# Patient Record
Sex: FEMALE | Race: AMERICAN INDIAN OR ALASKA NATIVE
[De-identification: names, ages, dates, MRNs, and addresses within clinical notes are randomized per-mention and may not be internally consistent; named-entity substitution may affect disease eponyms.]

---

## 2022-01-18 LAB
ALBUMIN SERPL-MCNC: 4.1 G/DL (ref 3.9–5)
ALT SERPL-CCNC: 28 UNITS/L (ref 7–56)
BUN SERPL-MCNC: 10 MG/DL (ref 7–17)
BUN/CREAT SERPL: 17 %
CALCIUM SERPL-MCNC: 9.6 MG/DL (ref 8.4–10.2)
HCT VFR BLD CALC: 41.2 % (ref 30.3–42.9)
HEMOLYSIS INDEX: 2
HGB BLD-MCNC: 13.2 GM/DL (ref 10.1–14.3)
MCHC RBC AUTO-ENTMCNC: 32 % (ref 30–34)
MCV RBC AUTO: 88 FL (ref 79–97)
PLATELET # BLD: 272 K/MM3 (ref 140–440)
RBC # BLD AUTO: 4.69 M/MM3 (ref 3.65–5.03)

## 2022-01-18 NOTE — ANESTHESIA CONSULTATION
Anesthesia Consult and Med Hx


Date of service: 01/19/22





- Airway


Anesthetic Teeth Evaluation: Good


ROM Head & Neck: Adequate


Mental/Hyoid Distance: Adequate


Mallampati Class: Class II


Intubation Access Assessment: Good





- Pre-Operative Health Status


ASA Pre-Surgery Classification: ASA1


Proposed Anesthetic Plan: General





- Pulmonary


Hx Smoking: No


Hx Sleep Apnea: No (MARIBELL PRE SCREEN LOW RISK)





- Cardiovascular System


Hx Hypertension: No





- Central Nervous System


Hx Psychiatric Problems: No





- Gastrointestinal


Hx Gastroesophageal Reflux Disease: No





- Hematic


Hx Anemia: No


Hx Sickle Cell Disease: No





- Other Systems


Hx Cancer: No


Hx Obesity: Yes

## 2022-01-19 ENCOUNTER — HOSPITAL ENCOUNTER (OUTPATIENT)
Dept: HOSPITAL 5 - OR | Age: 37
Discharge: HOME | End: 2022-01-19
Attending: UROLOGY
Payer: COMMERCIAL

## 2022-01-19 VITALS — DIASTOLIC BLOOD PRESSURE: 78 MMHG | SYSTOLIC BLOOD PRESSURE: 129 MMHG

## 2022-01-19 DIAGNOSIS — Z79.899: ICD-10-CM

## 2022-01-19 DIAGNOSIS — Z20.822: ICD-10-CM

## 2022-01-19 DIAGNOSIS — E66.9: ICD-10-CM

## 2022-01-19 DIAGNOSIS — Z98.890: ICD-10-CM

## 2022-01-19 DIAGNOSIS — N39.3: Primary | ICD-10-CM

## 2022-01-19 PROCEDURE — 36415 COLL VENOUS BLD VENIPUNCTURE: CPT

## 2022-01-19 PROCEDURE — 57288 REPAIR BLADDER DEFECT: CPT

## 2022-01-19 PROCEDURE — C1771 REP DEV, URINARY, W/SLING: HCPCS

## 2022-01-19 PROCEDURE — 80053 COMPREHEN METABOLIC PANEL: CPT

## 2022-01-19 PROCEDURE — 85027 COMPLETE CBC AUTOMATED: CPT

## 2022-01-19 PROCEDURE — 84703 CHORIONIC GONADOTROPIN ASSAY: CPT

## 2022-01-19 PROCEDURE — U0003 INFECTIOUS AGENT DETECTION BY NUCLEIC ACID (DNA OR RNA); SEVERE ACUTE RESPIRATORY SYNDROME CORONAVIRUS 2 (SARS-COV-2) (CORONAVIRUS DISEASE [COVID-19]), AMPLIFIED PROBE TECHNIQUE, MAKING USE OF HIGH THROUGHPUT TECHNOLOGIES AS DESCRIBED BY CMS-2020-01-R: HCPCS

## 2022-01-19 NOTE — OPERATIVE REPORT
DATE OF SURGERY: 01/19/2022



PREOPERATIVE DIAGNOSIS:  Stress urinary incontinence.



POSTOPERATIVE DIAGNOSIS:  Stress urinary incontinence.



PROCEDURES:  Cystoscopy, pubovaginal sling (TVT).



SURGEON:  Elier Reagan MD



ANESTHESIA:  General.



ESTIMATED BLOOD LOSS:  Minimal.



FLUIDS:  Crystalloid.



COMPLICATIONS:  No complications.



INDICATIONS:  This patient is a 36-year-old female seen in the office for 

persistent stress urinary incontinence for 2 years.  The patient has 2 kids 

delivered vaginally.  She wears 4 pads a day, Kegel's, trial of several 

medications including Ditropan, Myrbetriq, and Gemtesa, continued to have 

incontinence.  Urodynamic testing, peak flow 22 mL a second with stress 

incontinence and muscle weakness.  Discussed options, risks, benefits, 

complications were explained.  The patient agreed to proceed with surgical 

intervention.



DESCRIPTION OF PROCEDURE:  The patient was taken to the operative suite, placed 

in a supine position.  After adequate general anesthesia, she was then placed in

the dorsal lithotomy position, prepped and draped in a sterile fashion.  She was

placed in the dorsal lithotomy position, did not have a significant cystocele.  

Dyson catheter was placed.  Daniela-colored urine returned, midline anterior 

vaginal wall incision was made, lateral flaps were created.  The finger 

dissection posteriorly the pubic rami up to the rectus fascia could be 

appreciated.  Then, using the TVT tape and a trocar, the tape was brought from 

the vaginal area onto the suprapubic area, a small incision was made bilaterally

to allow the trocar to advance into the suprapubic area.  Cystoscopy was 

performed.  No bladder injury could be appreciated. With gentle tension, the TVT

tape was deployed.  Redundant tape was cut suprapubically.  The 2-0 Vicryl 

suture was used on the suprapubic area.  Redundant vaginal wall was excised and 

vaginal wall was closed with a 2-0 Vicryl in a running fashion.  Adequate 

hemostasis was achieved.  Vaginal packing was placed.  She was extubated and 

taken to recovery room.  She will go home with Dyson catheter and follow up in 

the office.







DD: 01/19/2022 10:23 AM

DT: 01/19/2022 11:22 AM

TID: [id number] RECEIPT: [id number]

[facility]/PRE/IQB

## 2022-01-19 NOTE — SHORT STAY SUMMARY
Short Stay Documentation


Date of service: 01/19/22





- History


H&P: obtained from office





- Allergies and Medications


Current Medications: 


                                    Allergies





No Known Allergies Allergy (Verified 01/13/22 09:22)


   





                                Home Medications











 Medication  Instructions  Recorded  Confirmed  Last Taken  Type


 


Birth Control Pills 1 tab PO DAILY 01/13/22 01/19/22 01/19/22 06:02 History








Active Medications





Acetaminophen (Acetaminophen 325 Mg/10.15 Ml Oral Liqd Unit Dose)  975 mg PO 

PREOP NR


   Stop: 01/19/22 20:00


   Last Admin: 01/19/22 07:42 Dose:  975 mg


   


Celecoxib (Celecoxib 200 Mg Cap)  400 mg PO PREOP NR


   Stop: 01/19/22 20:00


Gabapentin (Gabapentin 500 Mg/10 Ml Oral Liqd)  300 mg PO PREOP COOPER


   Stop: 01/19/22 12:00


   Last Admin: 01/19/22 07:42 Dose:  300 mg


   


Hydromorphone HCl (Hydromorphone 1 Mg/1 Ml Inj)  0.25 mg IV Q10MIN PRN


   PRN Reason: Pain, Moderate (4-6)


   Stop: 01/19/22 20:00


Hydromorphone HCl (Hydromorphone 1 Mg/1 Ml Inj)  0.5 mg IV Q10MIN PRN


   PRN Reason: Pain , Severe (7-10)


   Stop: 01/19/22 20:00


Lactated Ringer's (Lactated Ringers)  1,000 mls @ 125 mls/hr IV AS DIRECT COOPER


   Last Admin: 01/19/22 07:30 Dose:  125 mls/hr


   


Magnesium Oxide (Magnesium Oxide 400 Mg Tab)  400 mg PO ONCE NR


   Stop: 01/19/22 20:00


Midazolam HCl (Midazolam 2 Mg/2 Ml Inj)  2 mg IV PREOP NR


   Stop: 01/19/22 23:59


   Last Admin: 01/19/22 08:31 Dose:  2 mg


   











- Brief post op/procedure progress note


Date of procedure: 01/19/22


Pre-op diagnosis: bety


Post-op diagnosis: same


Procedure: 





cysto, sling (TVT)


Anesthesia: GETA


Surgeon: ANANDA VELARDE


Estimated blood loss: minimal


Pathology: none


Specimen disposition: to lab


Condition: stable





- Hospital course


Hospital course: 





bactrim & norco on chart





- Disposition


Condition at discharge: Stable


Disposition: 01 HOME / SELF CARE / HOMELESS





Short Stay Discharge Plan


Follow up with: 


TIFFANY NOLNE [Other] - 7 Days